# Patient Record
Sex: FEMALE | Race: ASIAN | NOT HISPANIC OR LATINO | Employment: UNEMPLOYED | URBAN - NONMETROPOLITAN AREA
[De-identification: names, ages, dates, MRNs, and addresses within clinical notes are randomized per-mention and may not be internally consistent; named-entity substitution may affect disease eponyms.]

---

## 2023-03-27 PROBLEM — K25.9 MULTIPLE GASTRIC ULCERS: Status: ACTIVE | Noted: 2023-03-27

## 2024-02-16 ENCOUNTER — TELEPHONE (OUTPATIENT)
Dept: PRIMARY CARE | Facility: CLINIC | Age: 69
End: 2024-02-16
Payer: COMMERCIAL

## 2024-02-16 NOTE — TELEPHONE ENCOUNTER
Any postmenopausal bleeding absolutely needs evaluated by gynecology     We could also have her start with some basicevaluation in our office, pelvic exam, ultrasound, etc, but still will need referral to gynecology   Please offer acute office visit, any doc       Dr. Janice Burris, Dr. Radha Rubio  [facility]   [address], [address]  [place] [address] [phone]    [facility] [address], [place] [address]  [phone]

## 2024-02-16 NOTE — TELEPHONE ENCOUNTER
Patient called in she has been in menopause since she has been 56 and recently in this past month she has been spotting when she wipes. She has not had to use any pads or tampons. She is wondering if she come in and see you or a gynecologist. If you would like her to see a gynecologist then she would like to know if you have any recommendations for her? Please call back. She also has an appointment for physical set up with you in March.

## 2024-02-19 PROBLEM — G70.00 MYASTHENIA GRAVIS (MULTI): Status: ACTIVE | Noted: 2024-02-19

## 2024-02-19 PROBLEM — F32.2 DEPRESSION, MAJOR, SINGLE EPISODE, SEVERE (MULTI): Status: ACTIVE | Noted: 2024-02-19

## 2024-02-19 PROBLEM — M25.561 CHRONIC PAIN OF BOTH KNEES: Status: ACTIVE | Noted: 2024-02-19

## 2024-02-19 PROBLEM — R91.1 LUNG NODULE: Status: ACTIVE | Noted: 2024-02-19

## 2024-02-19 PROBLEM — K76.0 FATTY LIVER: Status: ACTIVE | Noted: 2024-02-19

## 2024-02-19 PROBLEM — E55.9 VITAMIN D DEFICIENCY: Status: ACTIVE | Noted: 2024-02-19

## 2024-02-19 PROBLEM — G89.29 CHRONIC PAIN OF BOTH KNEES: Status: ACTIVE | Noted: 2024-02-19

## 2024-02-19 PROBLEM — E66.9 OBESITY: Status: ACTIVE | Noted: 2024-02-19

## 2024-02-19 PROBLEM — M17.9 KNEE OSTEOARTHRITIS: Status: ACTIVE | Noted: 2024-02-19

## 2024-02-19 PROBLEM — H02.409 PTOSIS: Status: ACTIVE | Noted: 2024-02-19

## 2024-02-19 PROBLEM — R03.0 ELEVATED BLOOD PRESSURE READING: Status: ACTIVE | Noted: 2024-02-19

## 2024-02-19 PROBLEM — K22.89 ESOPHAGEAL THICKENING: Status: ACTIVE | Noted: 2024-02-19

## 2024-02-19 PROBLEM — E78.5 HYPERLIPIDEMIA: Status: ACTIVE | Noted: 2024-02-19

## 2024-02-19 PROBLEM — F41.9 ANXIETY: Status: ACTIVE | Noted: 2024-02-19

## 2024-02-19 PROBLEM — J30.9 ALLERGIC RHINITIS: Status: ACTIVE | Noted: 2024-02-19

## 2024-02-19 PROBLEM — M81.0 OSTEOPOROSIS: Status: ACTIVE | Noted: 2024-02-19

## 2024-02-19 PROBLEM — S83.249A MEDIAL MENISCUS TEAR: Status: ACTIVE | Noted: 2024-02-19

## 2024-02-19 PROBLEM — F17.210 CIGARETTE NICOTINE DEPENDENCE WITHOUT COMPLICATION: Status: ACTIVE | Noted: 2024-02-19

## 2024-02-19 PROBLEM — M25.562 CHRONIC PAIN OF BOTH KNEES: Status: ACTIVE | Noted: 2024-02-19

## 2024-02-19 PROBLEM — I10 BENIGN ESSENTIAL HYPERTENSION: Status: ACTIVE | Noted: 2024-02-19

## 2024-02-19 PROBLEM — E27.8 ADRENAL NODULE (MULTI): Status: ACTIVE | Noted: 2024-02-19

## 2024-02-19 PROBLEM — E27.9 ADRENAL NODULE: Status: ACTIVE | Noted: 2024-02-19

## 2024-02-19 NOTE — PROGRESS NOTES
Subjective   Patient ID: Kiana Casillas is a 68 y.o. female who presents for Vaginal Bleeding (First noticed this 1 month ago some slight pink /).    HPI     Here today for evaluation of postmenopausal vaginal bleeding--   Menopause age 56  For past month, she is noticing spotting when wiping   Last pap- many many years ago   Started 1 month ago  Happening sporadically (not daily)   Just noticing it when wiping   She did start wearing a pad and noting slight pink bleeding there as well   No dysuria, freq, urgency, hematuria   No abd pain  No blood in stools   Smoking     Current Outpatient Medications   Medication Sig Dispense Refill    aspirin 81 mg EC tablet Take 2 tablets (162 mg) by mouth once daily.      b complex 0.4 mg tablet Take 1 tablet by mouth once daily.      cetirizine (ZyrTEC) 10 mg chewable tablet Chew.      cholecalciferol (Vitamin D-3) 125 MCG (5000 UT) capsule Take 1 capsule (125 mcg) by mouth twice a day.      coenzyme Q-10 100 mg capsule Take 1 capsule (100 mg) by mouth twice a day.      ginkgo biloba xt-bacopa lf ext (Ginkgo Biloba Plus, Bacopa,) 120-40 mg capsule Take by mouth.      krill oil 500 mg capsule Take by mouth.      multivitamin tablet Take 1 tablet by mouth once daily.      s-adenosylmethionine (FESTUS-e, Enteric Coated,) 400 mg tablet,delayed release (DR/EC) Take by mouth.      omega-3 fatty acids-fish oil (One-Per-Day Omega-3) 684-1,200 mg capsule Take by mouth.       No current facility-administered medications for this visit.       Review of Systems   Constitutional:  Negative for chills, fatigue and fever.   Respiratory:  Negative for cough and shortness of breath.    Cardiovascular:  Negative for chest pain and palpitations.   Gastrointestinal:  Negative for abdominal pain and blood in stool.   Genitourinary:  Positive for vaginal bleeding. Negative for dysuria, frequency, genital sores, hematuria, pelvic pain, urgency and vaginal discharge.             Objective   /79  "(BP Location: Left arm, Patient Position: Sitting, BP Cuff Size: Adult)   Pulse 75   Temp 36.8 °C (98.2 °F) (Temporal)   Resp 16   Ht 1.575 m (5' 2\")   Wt 74.2 kg (163 lb 9.6 oz)   LMP  (Exact Date)   SpO2 98%   BMI 29.92 kg/m²     Physical Exam    Constitutional: Well developed, well nourished, alert and in no acute distress.  Genitourinary: External genitalia and vagina normal. Urethra normal. No suprapubic tenderness to palpation. No vaginal discharge or bleeding. Cervix normal.     Assessment/Plan   Problem List Items Addressed This Visit             ICD-10-CM    Cigarette nicotine dependence without complication F17.210    Hyperlipidemia E78.5    Relevant Orders    TSH with reflex to Free T4 if abnormal    Comprehensive Metabolic Panel    Lipid Panel    Vitamin D deficiency E55.9    Relevant Orders    Vitamin D 25-Hydroxy,Total (for eval of Vitamin D levels)    Postmenopausal vaginal bleeding - Primary N95.0     Work-up initiated --   Pap done today   Schedule with GYN for EMBx and Pelvis US ordered            Relevant Orders    THINPREP PAP TEST    CBC and Auto Differential    Referral to Gynecology    US pelvis     Other Visit Diagnoses         Codes    Screening for cervical cancer     Z12.4    Relevant Orders    THINPREP PAP TEST    Need for hepatitis C screening test     Z11.59    Relevant Orders    Hepatitis C Antibody          Wellness already scheduled next month   Lynn Bills DO  2/20/2024  "

## 2024-02-20 ENCOUNTER — OFFICE VISIT (OUTPATIENT)
Dept: PRIMARY CARE | Facility: CLINIC | Age: 69
End: 2024-02-20
Payer: COMMERCIAL

## 2024-02-20 ENCOUNTER — LAB (OUTPATIENT)
Dept: LAB | Facility: LAB | Age: 69
End: 2024-02-20
Payer: COMMERCIAL

## 2024-02-20 VITALS
SYSTOLIC BLOOD PRESSURE: 121 MMHG | TEMPERATURE: 98.2 F | HEART RATE: 75 BPM | BODY MASS INDEX: 30.11 KG/M2 | HEIGHT: 62 IN | OXYGEN SATURATION: 98 % | RESPIRATION RATE: 16 BRPM | WEIGHT: 163.6 LBS | DIASTOLIC BLOOD PRESSURE: 79 MMHG

## 2024-02-20 DIAGNOSIS — Z12.4 SCREENING FOR CERVICAL CANCER: ICD-10-CM

## 2024-02-20 DIAGNOSIS — E78.5 HYPERLIPIDEMIA, UNSPECIFIED HYPERLIPIDEMIA TYPE: ICD-10-CM

## 2024-02-20 DIAGNOSIS — N95.0 POSTMENOPAUSAL VAGINAL BLEEDING: Primary | ICD-10-CM

## 2024-02-20 DIAGNOSIS — E55.9 VITAMIN D DEFICIENCY: ICD-10-CM

## 2024-02-20 DIAGNOSIS — Z11.59 NEED FOR HEPATITIS C SCREENING TEST: ICD-10-CM

## 2024-02-20 DIAGNOSIS — F17.210 CIGARETTE NICOTINE DEPENDENCE WITHOUT COMPLICATION: ICD-10-CM

## 2024-02-20 DIAGNOSIS — N95.0 POSTMENOPAUSAL VAGINAL BLEEDING: ICD-10-CM

## 2024-02-20 PROCEDURE — 87800 DETECT AGNT MULT DNA DIREC: CPT

## 2024-02-20 PROCEDURE — 99214 OFFICE O/P EST MOD 30 MIN: CPT | Performed by: FAMILY MEDICINE

## 2024-02-20 PROCEDURE — 36415 COLL VENOUS BLD VENIPUNCTURE: CPT

## 2024-02-20 PROCEDURE — 86803 HEPATITIS C AB TEST: CPT

## 2024-02-20 PROCEDURE — 3074F SYST BP LT 130 MM HG: CPT | Performed by: FAMILY MEDICINE

## 2024-02-20 PROCEDURE — 3078F DIAST BP <80 MM HG: CPT | Performed by: FAMILY MEDICINE

## 2024-02-20 PROCEDURE — 87624 HPV HI-RISK TYP POOLED RSLT: CPT

## 2024-02-20 PROCEDURE — 1159F MED LIST DOCD IN RCRD: CPT | Performed by: FAMILY MEDICINE

## 2024-02-20 PROCEDURE — 82306 VITAMIN D 25 HYDROXY: CPT

## 2024-02-20 PROCEDURE — 84443 ASSAY THYROID STIM HORMONE: CPT

## 2024-02-20 PROCEDURE — 80053 COMPREHEN METABOLIC PANEL: CPT

## 2024-02-20 PROCEDURE — 80061 LIPID PANEL: CPT

## 2024-02-20 PROCEDURE — 87661 TRICHOMONAS VAGINALIS AMPLIF: CPT

## 2024-02-20 PROCEDURE — 1160F RVW MEDS BY RX/DR IN RCRD: CPT | Performed by: FAMILY MEDICINE

## 2024-02-20 PROCEDURE — 85025 COMPLETE CBC W/AUTO DIFF WBC: CPT

## 2024-02-20 PROCEDURE — 88142 CYTOPATH C/V THIN LAYER: CPT

## 2024-02-20 RX ORDER — AMLODIPINE BESYLATE 5 MG/1
1 TABLET ORAL DAILY
COMMUNITY
Start: 2021-05-03 | End: 2024-02-20 | Stop reason: ALTCHOICE

## 2024-02-20 RX ORDER — LANOLIN ALCOHOL/MO/W.PET/CERES
400 CREAM (GRAM) TOPICAL
COMMUNITY
Start: 2014-05-22 | End: 2024-02-20 | Stop reason: ALTCHOICE

## 2024-02-20 RX ORDER — CHOLECALCIFEROL (VITAMIN D3) 125 MCG
CAPSULE ORAL
COMMUNITY
End: 2024-03-18 | Stop reason: ALTCHOICE

## 2024-02-20 RX ORDER — MULTIVITAMIN
1 TABLET ORAL
COMMUNITY
Start: 2014-05-22

## 2024-02-20 RX ORDER — CALCIUM CARBONATE/VITAMIN D3 600 MG-10
TABLET ORAL
COMMUNITY

## 2024-02-20 RX ORDER — EPINEPHRINE 0.22MG
100 AEROSOL WITH ADAPTER (ML) INHALATION 2 TIMES DAILY
COMMUNITY
Start: 2014-05-22

## 2024-02-20 RX ORDER — MULTIVITAMIN/IRON/FOLIC ACID 18MG-0.4MG
1 TABLET ORAL DAILY
COMMUNITY

## 2024-02-20 RX ORDER — S-ADENOSYLMETHIONINE SUL TOSYL 400 MG
TABLET, DELAYED RELEASE (ENTERIC COATED) ORAL
COMMUNITY

## 2024-02-20 RX ORDER — ASPIRIN 81 MG/1
2 TABLET ORAL DAILY
COMMUNITY
Start: 2014-03-05

## 2024-02-20 RX ORDER — TOPIRAMATE SPINKLE 15 MG/1
15 CAPSULE ORAL 2 TIMES DAILY
COMMUNITY
Start: 2014-05-22 | End: 2024-02-20 | Stop reason: ALTCHOICE

## 2024-02-20 RX ORDER — MV/FA/DHA/EPA/FISH OIL/SAW/GNK 400MCG-200
COMBINATION PACKAGE (EA) ORAL
COMMUNITY

## 2024-02-20 RX ORDER — CHOLECALCIFEROL (VITAMIN D3) 125 MCG
5000 CAPSULE ORAL 2 TIMES DAILY
COMMUNITY
Start: 2014-05-22

## 2024-02-20 RX ORDER — CETIRIZINE HYDROCHLORIDE 10 MG/1
TABLET, CHEWABLE ORAL
COMMUNITY
Start: 2007-08-29

## 2024-02-20 ASSESSMENT — ENCOUNTER SYMPTOMS
ABDOMINAL PAIN: 0
DYSURIA: 0
HEMATURIA: 0
COUGH: 0
FATIGUE: 0
BLOOD IN STOOL: 0
SHORTNESS OF BREATH: 0
FEVER: 0
CHILLS: 0
FREQUENCY: 0
PALPITATIONS: 0

## 2024-02-21 ENCOUNTER — HOSPITAL ENCOUNTER (OUTPATIENT)
Dept: RADIOLOGY | Facility: CLINIC | Age: 69
Discharge: HOME | End: 2024-02-21
Payer: COMMERCIAL

## 2024-02-21 DIAGNOSIS — N95.0 POSTMENOPAUSAL BLEEDING: ICD-10-CM

## 2024-02-21 DIAGNOSIS — N95.0 POSTMENOPAUSAL VAGINAL BLEEDING: ICD-10-CM

## 2024-02-21 LAB
25(OH)D3 SERPL-MCNC: 43 NG/ML (ref 30–100)
ALBUMIN SERPL BCP-MCNC: 4.6 G/DL (ref 3.4–5)
ALP SERPL-CCNC: 75 U/L (ref 33–136)
ALT SERPL W P-5'-P-CCNC: 10 U/L (ref 7–45)
ANION GAP SERPL CALC-SCNC: 15 MMOL/L (ref 10–20)
AST SERPL W P-5'-P-CCNC: 14 U/L (ref 9–39)
BASOPHILS # BLD AUTO: 0.07 X10*3/UL (ref 0–0.1)
BASOPHILS NFR BLD AUTO: 1.1 %
BILIRUB SERPL-MCNC: 0.4 MG/DL (ref 0–1.2)
BUN SERPL-MCNC: 15 MG/DL (ref 6–23)
CALCIUM SERPL-MCNC: 9.8 MG/DL (ref 8.6–10.6)
CHLORIDE SERPL-SCNC: 103 MMOL/L (ref 98–107)
CHOLEST SERPL-MCNC: 239 MG/DL (ref 0–199)
CHOLESTEROL/HDL RATIO: 3.5
CO2 SERPL-SCNC: 29 MMOL/L (ref 21–32)
CREAT SERPL-MCNC: 0.57 MG/DL (ref 0.5–1.05)
EGFRCR SERPLBLD CKD-EPI 2021: >90 ML/MIN/1.73M*2
EOSINOPHIL # BLD AUTO: 0.05 X10*3/UL (ref 0–0.7)
EOSINOPHIL NFR BLD AUTO: 0.8 %
ERYTHROCYTE [DISTWIDTH] IN BLOOD BY AUTOMATED COUNT: 13.2 % (ref 11.5–14.5)
GLUCOSE SERPL-MCNC: 99 MG/DL (ref 74–99)
HCT VFR BLD AUTO: 46.8 % (ref 36–46)
HCV AB SER QL: NONREACTIVE
HDLC SERPL-MCNC: 68 MG/DL
HGB BLD-MCNC: 15.7 G/DL (ref 12–16)
IMM GRANULOCYTES # BLD AUTO: 0.02 X10*3/UL (ref 0–0.7)
IMM GRANULOCYTES NFR BLD AUTO: 0.3 % (ref 0–0.9)
LDLC SERPL CALC-MCNC: 153 MG/DL
LYMPHOCYTES # BLD AUTO: 2.24 X10*3/UL (ref 1.2–4.8)
LYMPHOCYTES NFR BLD AUTO: 35.3 %
MCH RBC QN AUTO: 32.2 PG (ref 26–34)
MCHC RBC AUTO-ENTMCNC: 33.5 G/DL (ref 32–36)
MCV RBC AUTO: 96 FL (ref 80–100)
MONOCYTES # BLD AUTO: 0.56 X10*3/UL (ref 0.1–1)
MONOCYTES NFR BLD AUTO: 8.8 %
NEUTROPHILS # BLD AUTO: 3.41 X10*3/UL (ref 1.2–7.7)
NEUTROPHILS NFR BLD AUTO: 53.7 %
NON HDL CHOLESTEROL: 171 MG/DL (ref 0–149)
NRBC BLD-RTO: 0 /100 WBCS (ref 0–0)
PLATELET # BLD AUTO: 307 X10*3/UL (ref 150–450)
POTASSIUM SERPL-SCNC: 4.6 MMOL/L (ref 3.5–5.3)
PROT SERPL-MCNC: 7.5 G/DL (ref 6.4–8.2)
RBC # BLD AUTO: 4.87 X10*6/UL (ref 4–5.2)
SODIUM SERPL-SCNC: 142 MMOL/L (ref 136–145)
TRIGL SERPL-MCNC: 90 MG/DL (ref 0–149)
TSH SERPL-ACNC: 0.75 MIU/L (ref 0.44–3.98)
VLDL: 18 MG/DL (ref 0–40)
WBC # BLD AUTO: 6.4 X10*3/UL (ref 4.4–11.3)

## 2024-02-21 PROCEDURE — 76856 US EXAM PELVIC COMPLETE: CPT

## 2024-02-21 PROCEDURE — 76830 TRANSVAGINAL US NON-OB: CPT | Performed by: RADIOLOGY

## 2024-02-21 PROCEDURE — 76830 TRANSVAGINAL US NON-OB: CPT

## 2024-02-21 PROCEDURE — 76857 US EXAM PELVIC LIMITED: CPT | Performed by: RADIOLOGY

## 2024-02-22 LAB
C TRACH RRNA SPEC QL NAA+PROBE: NEGATIVE
N GONORRHOEA DNA SPEC QL PROBE+SIG AMP: NEGATIVE
T VAGINALIS RRNA SPEC QL NAA+PROBE: NEGATIVE

## 2024-02-23 DIAGNOSIS — K22.89 ESOPHAGEAL THICKENING: Primary | ICD-10-CM

## 2024-02-23 DIAGNOSIS — N95.0 POSTMENOPAUSAL VAGINAL BLEEDING: ICD-10-CM

## 2024-02-23 DIAGNOSIS — R93.89 ENDOMETRIAL THICKENING ON ULTRASOUND: ICD-10-CM

## 2024-02-28 ENCOUNTER — APPOINTMENT (OUTPATIENT)
Dept: PRIMARY CARE | Facility: CLINIC | Age: 69
End: 2024-02-28
Payer: COMMERCIAL

## 2024-03-05 LAB
CYTOLOGY CMNT CVX/VAG CYTO-IMP: NORMAL
HPV HR 12 DNA GENITAL QL NAA+PROBE: NEGATIVE
HPV HR GENOTYPES PNL CVX NAA+PROBE: NEGATIVE
HPV16 DNA SPEC QL NAA+PROBE: NEGATIVE
HPV18 DNA SPEC QL NAA+PROBE: NEGATIVE
LAB AP HPV GENOTYPE QUESTION: YES
LAB AP HPV HR: NORMAL
LAB AP PAP ADDITIONAL TESTS: NORMAL
LABORATORY COMMENT REPORT: NORMAL
LABORATORY COMMENT REPORT: NORMAL
MENSTRUAL HX REPORTED: NORMAL
PATH REPORT.TOTAL CANCER: NORMAL

## 2024-03-18 ENCOUNTER — OFFICE VISIT (OUTPATIENT)
Dept: PRIMARY CARE | Facility: CLINIC | Age: 69
End: 2024-03-18
Payer: COMMERCIAL

## 2024-03-18 VITALS
OXYGEN SATURATION: 98 % | HEIGHT: 62 IN | HEART RATE: 84 BPM | BODY MASS INDEX: 30.24 KG/M2 | SYSTOLIC BLOOD PRESSURE: 138 MMHG | RESPIRATION RATE: 16 BRPM | WEIGHT: 164.3 LBS | TEMPERATURE: 97.8 F | DIASTOLIC BLOOD PRESSURE: 83 MMHG

## 2024-03-18 DIAGNOSIS — E78.5 HYPERLIPIDEMIA, UNSPECIFIED HYPERLIPIDEMIA TYPE: ICD-10-CM

## 2024-03-18 DIAGNOSIS — N95.0 POSTMENOPAUSAL VAGINAL BLEEDING: ICD-10-CM

## 2024-03-18 DIAGNOSIS — R93.89 ENDOMETRIAL THICKENING ON ULTRASOUND: ICD-10-CM

## 2024-03-18 DIAGNOSIS — E66.9 CLASS 1 OBESITY WITHOUT SERIOUS COMORBIDITY WITH BODY MASS INDEX (BMI) OF 30.0 TO 30.9 IN ADULT, UNSPECIFIED OBESITY TYPE: ICD-10-CM

## 2024-03-18 DIAGNOSIS — G70.00 MYASTHENIA GRAVIS (MULTI): ICD-10-CM

## 2024-03-18 DIAGNOSIS — E27.8 ADRENAL NODULE (MULTI): ICD-10-CM

## 2024-03-18 DIAGNOSIS — M81.0 AGE-RELATED OSTEOPOROSIS WITHOUT CURRENT PATHOLOGICAL FRACTURE: ICD-10-CM

## 2024-03-18 DIAGNOSIS — M25.561 CHRONIC PAIN OF BOTH KNEES: ICD-10-CM

## 2024-03-18 DIAGNOSIS — M25.562 CHRONIC PAIN OF BOTH KNEES: ICD-10-CM

## 2024-03-18 DIAGNOSIS — M79.605 LEFT LEG PAIN: ICD-10-CM

## 2024-03-18 DIAGNOSIS — Z53.20 MAMMOGRAM DECLINED: ICD-10-CM

## 2024-03-18 DIAGNOSIS — Z12.11 SCREEN FOR COLON CANCER: ICD-10-CM

## 2024-03-18 DIAGNOSIS — E55.9 VITAMIN D DEFICIENCY: ICD-10-CM

## 2024-03-18 DIAGNOSIS — R91.1 LUNG NODULE: ICD-10-CM

## 2024-03-18 DIAGNOSIS — Z00.00 ROUTINE GENERAL MEDICAL EXAMINATION AT HEALTH CARE FACILITY: Primary | ICD-10-CM

## 2024-03-18 DIAGNOSIS — F17.210 CIGARETTE NICOTINE DEPENDENCE WITHOUT COMPLICATION: ICD-10-CM

## 2024-03-18 DIAGNOSIS — M17.9 OSTEOARTHRITIS OF KNEE, UNSPECIFIED LATERALITY, UNSPECIFIED OSTEOARTHRITIS TYPE: ICD-10-CM

## 2024-03-18 DIAGNOSIS — G89.29 CHRONIC PAIN OF BOTH KNEES: ICD-10-CM

## 2024-03-18 DIAGNOSIS — Z00.00 MEDICARE ANNUAL WELLNESS VISIT, SUBSEQUENT: ICD-10-CM

## 2024-03-18 DIAGNOSIS — Z13.39 ALCOHOL SCREENING: ICD-10-CM

## 2024-03-18 PROBLEM — E66.811 CLASS 1 OBESITY WITHOUT SERIOUS COMORBIDITY WITH BODY MASS INDEX (BMI) OF 30.0 TO 30.9 IN ADULT: Status: ACTIVE | Noted: 2024-02-19

## 2024-03-18 PROBLEM — I10 BENIGN ESSENTIAL HYPERTENSION: Status: RESOLVED | Noted: 2024-02-19 | Resolved: 2024-03-18

## 2024-03-18 PROBLEM — F32.2 DEPRESSION, MAJOR, SINGLE EPISODE, SEVERE (MULTI): Status: RESOLVED | Noted: 2024-02-19 | Resolved: 2024-03-18

## 2024-03-18 PROCEDURE — G0442 ANNUAL ALCOHOL SCREEN 15 MIN: HCPCS | Performed by: FAMILY MEDICINE

## 2024-03-18 PROCEDURE — 1170F FXNL STATUS ASSESSED: CPT | Performed by: FAMILY MEDICINE

## 2024-03-18 PROCEDURE — 1123F ACP DISCUSS/DSCN MKR DOCD: CPT | Performed by: FAMILY MEDICINE

## 2024-03-18 PROCEDURE — 1160F RVW MEDS BY RX/DR IN RCRD: CPT | Performed by: FAMILY MEDICINE

## 2024-03-18 PROCEDURE — 1158F ADVNC CARE PLAN TLK DOCD: CPT | Performed by: FAMILY MEDICINE

## 2024-03-18 PROCEDURE — 3008F BODY MASS INDEX DOCD: CPT | Performed by: FAMILY MEDICINE

## 2024-03-18 PROCEDURE — 99214 OFFICE O/P EST MOD 30 MIN: CPT | Performed by: FAMILY MEDICINE

## 2024-03-18 PROCEDURE — G0439 PPPS, SUBSEQ VISIT: HCPCS | Performed by: FAMILY MEDICINE

## 2024-03-18 PROCEDURE — 1159F MED LIST DOCD IN RCRD: CPT | Performed by: FAMILY MEDICINE

## 2024-03-18 ASSESSMENT — LIFESTYLE VARIABLES
HOW OFTEN DURING THE LAST YEAR HAVE YOU FAILED TO DO WHAT WAS NORMALLY EXPECTED FROM YOU BECAUSE OF DRINKING: NEVER
HOW OFTEN DURING THE LAST YEAR HAVE YOU BEEN UNABLE TO REMEMBER WHAT HAPPENED THE NIGHT BEFORE BECAUSE YOU HAD BEEN DRINKING: NEVER
AUDIT-C TOTAL SCORE: 0
HAVE YOU OR SOMEONE ELSE BEEN INJURED AS A RESULT OF YOUR DRINKING: NO
HOW OFTEN DO YOU HAVE A DRINK CONTAINING ALCOHOL: NEVER
HOW OFTEN DO YOU HAVE SIX OR MORE DRINKS ON ONE OCCASION: NEVER
HOW MANY STANDARD DRINKS CONTAINING ALCOHOL DO YOU HAVE ON A TYPICAL DAY: PATIENT DOES NOT DRINK
AUDIT TOTAL SCORE: 0
HAS A RELATIVE, FRIEND, DOCTOR, OR ANOTHER HEALTH PROFESSIONAL EXPRESSED CONCERN ABOUT YOUR DRINKING OR SUGGESTED YOU CUT DOWN: NO
SKIP TO QUESTIONS 9-10: 1
HOW OFTEN DURING THE LAST YEAR HAVE YOU HAD A FEELING OF GUILT OR REMORSE AFTER DRINKING: NEVER
HOW OFTEN DURING THE LAST YEAR HAVE YOU FOUND THAT YOU WERE NOT ABLE TO STOP DRINKING ONCE YOU HAD STARTED: NEVER
HOW OFTEN DURING THE LAST YEAR HAVE YOU NEEDED AN ALCOHOLIC DRINK FIRST THING IN THE MORNING TO GET YOURSELF GOING AFTER A NIGHT OF HEAVY DRINKING: NEVER

## 2024-03-18 ASSESSMENT — ENCOUNTER SYMPTOMS
COUGH: 0
CHILLS: 0
BACK PAIN: 0
ARTHRALGIAS: 1
HEADACHES: 0
FATIGUE: 0
PSYCHIATRIC NEGATIVE: 1
ABDOMINAL PAIN: 0
COLOR CHANGE: 0
WEAKNESS: 0
DIZZINESS: 0
SHORTNESS OF BREATH: 0
NAUSEA: 0
JOINT SWELLING: 0
SORE THROAT: 0
VOMITING: 0
DIARRHEA: 0
PALPITATIONS: 0
CONSTIPATION: 0
FEVER: 0
NUMBNESS: 0

## 2024-03-18 ASSESSMENT — PATIENT HEALTH QUESTIONNAIRE - PHQ9
9. THOUGHTS THAT YOU WOULD BE BETTER OFF DEAD, OR OF HURTING YOURSELF: NOT AT ALL
7. TROUBLE CONCENTRATING ON THINGS, SUCH AS READING THE NEWSPAPER OR WATCHING TELEVISION: NOT AT ALL
SUM OF ALL RESPONSES TO PHQ QUESTIONS 1-9: 5
1. LITTLE INTEREST OR PLEASURE IN DOING THINGS: NOT AT ALL
8. MOVING OR SPEAKING SO SLOWLY THAT OTHER PEOPLE COULD HAVE NOTICED. OR THE OPPOSITE, BEING SO FIGETY OR RESTLESS THAT YOU HAVE BEEN MOVING AROUND A LOT MORE THAN USUAL: NOT AT ALL
10. IF YOU CHECKED OFF ANY PROBLEMS, HOW DIFFICULT HAVE THESE PROBLEMS MADE IT FOR YOU TO DO YOUR WORK, TAKE CARE OF THINGS AT HOME, OR GET ALONG WITH OTHER PEOPLE: NOT DIFFICULT AT ALL
2. FEELING DOWN, DEPRESSED OR HOPELESS: NOT AT ALL
3. TROUBLE FALLING OR STAYING ASLEEP OR SLEEPING TOO MUCH: SEVERAL DAYS
5. POOR APPETITE OR OVEREATING: MORE THAN HALF THE DAYS
SUM OF ALL RESPONSES TO PHQ9 QUESTIONS 1 AND 2: 0
6. FEELING BAD ABOUT YOURSELF - OR THAT YOU ARE A FAILURE OR HAVE LET YOURSELF OR YOUR FAMILY DOWN: SEVERAL DAYS
4. FEELING TIRED OR HAVING LITTLE ENERGY: SEVERAL DAYS

## 2024-03-18 ASSESSMENT — ACTIVITIES OF DAILY LIVING (ADL)
TAKING_MEDICATION: INDEPENDENT
GROCERY_SHOPPING: INDEPENDENT
DOING_HOUSEWORK: INDEPENDENT
DRESSING: INDEPENDENT
BATHING: INDEPENDENT
MANAGING_FINANCES: INDEPENDENT

## 2024-03-18 NOTE — ASSESSMENT & PLAN NOTE
Medication declined  10 year ASCVD risk 12.7%     Hyperlipidemia-   You have high cholesterol. (hyperlipidemia). This increases your risk for cardiovascular disease.(Heart attack/stroke).  It is recommended that the patient limit refined carbohydrates such as breads, cereals, alcohol, pasta, pretzels, and sugar- sweetened foods and beverages.  Eat lots of vegetables, lean proteins such as eggs, fish, chicken, and fruit.   Eat small amounts of healthy fat each day -  a handful of walnuts, an avocado, olive oil on your salad, and fatty fishes such as salmon. Exercise should be included as you are able, with a goal of 30 minutes 4-6 days per week    Continue any prescribed and advised OTC medications, in addition, as reminder:  *For high LDL (>130) use blueberries 1 cup daily  *Psyllium capsules or powder 2 times daily, such as Flux or Metamucil.   *Consider also adding plant stanols and sterols such as Nature Made CholestOff, available over the counter.     *For high triglycerides- start fish oil 2,000 - 4,000 mg daily.

## 2024-03-18 NOTE — ASSESSMENT & PLAN NOTE
CT ordered     Counseling for Lung Cancer Screening with LDCT:  Current smoker:   I discussed smoking history/status that determined patient meets criteria for lung cancer screening with low dose CT scan.  By using shared decision making we determined the patient will benefit from the screening, including a discussion of benefits and harms of screening, follow-up diagnostic testing, over-diagnosis, false positive rate, and total radiation exposure. I counseled the patient on the importance of adhering to annual lung cancer low dose CT screening, the impact of comorbidities, and his or her ability or willingness to undergo diagnosis and treatment if abnormalities discovered. I spent 3-10minutes counseling the patient on the importance of abstaining from cigarette smoking and, if appropriate, provide information about tobacco-cessation interventions.  I provided patient an order for Low dose CT lung cancer screening.

## 2024-03-18 NOTE — ASSESSMENT & PLAN NOTE
Medication declined  Bone health support: Make sure you are getting at least 1,200 mg daily of calcium (preferred via dietary intake, okay for supplements if needed), and 2,000 IU of vitamin D3 daily.   Encourage weight bearing exercise as able, along with balance and strength training  Reduce fall risk in the home

## 2024-03-18 NOTE — PROGRESS NOTES
Subjective   Reason for Visit: Kiana Casillas is an 68 y.o. female here for a Medicare Wellness visit.     Past Medical, Surgical, and Family History reviewed and updated in chart.    Reviewed all medications by prescribing practitioner or clinical pharmacist (such as prescriptions, OTCs, herbal therapies and supplements) and documented in the medical record.    HPI    HPOA- daughter Randi     She was seen last month for postmenopausal vaginal bleeding-  U/S showed 2 cm endometrium- referred to GYN  Scheduled with Dr. Burris - was scheduled next week but states they pushed her appt back to April - she is not happy about this   Pap 24 NILM HPV neg   Hasn't had any further spotting in past 2 weeks     Mammogram 5/3/21 neg - due - she declines     Osteoporosis- Dexa 22 - declines medication   Taking vit D 4000/ multivitamin     Adrenal nodule- 1.1 cm right side   Needs follow up adrenal CT     Labs done and reviewed today -   HPL - 10 year ASCVD risk 12.7%   CT chest showed mild coronary artery calcifications- she declines medications     Colonoscopy- never - willing to schedule     Lung cancer screenin2022- due - will do    Immunizations: pcv20 - declines     Myasthenia gravis- she has been on disability since    Last saw Dr. Aden in 2018- no longer on pyridostigmine      Smoking- 40+ years   1 ppd   Not ready to quit     Wants to do acupuncture  Doing home exercises  Tried PT already  Leg pain behind left knee, has known arthritis, has seen ortho and had injections   MRI done 2019 showed medial meniscus tear   Denies sciatica and back pain   No swelling, erythema, warmth     Current Outpatient Medications   Medication Sig Dispense Refill    aspirin 81 mg EC tablet Take 2 tablets (162 mg) by mouth once daily.      b complex 0.4 mg tablet Take 1 tablet by mouth once daily.      cetirizine (ZyrTEC) 10 mg chewable tablet Chew.      cholecalciferol (Vitamin D-3) 125 MCG (5000 UT) capsule Take  "1 capsule (125 mcg) by mouth twice a day.      coenzyme Q-10 100 mg capsule Take 1 capsule (100 mg) by mouth twice a day.      horse chestnut seed (HORSE CHESTNUT ORAL) Take by mouth.      krill oil 500 mg capsule Take by mouth.      multivitamin tablet Take 1 tablet by mouth once daily.      omega-3 fatty acids-fish oil (One-Per-Day Omega-3) 684-1,200 mg capsule Take by mouth.      s-adenosylmethionine (FESTUS-e, Enteric Coated,) 400 mg tablet,delayed release (DR/EC) Take by mouth.       No current facility-administered medications for this visit.         Patient Care Team:  Lynn Bills DO as PCP - General  Lynn Bills DO as PCP - [employer] PCP  Guille Khurram Gallegos MD as Referring Physician (Gastroenterology)  Erika Hamm MD as Referring Physician (Gastroenterology)     Review of Systems   Constitutional:  Negative for chills, fatigue and fever.   HENT:  Negative for congestion, ear pain and sore throat.    Eyes:  Negative for visual disturbance.   Respiratory:  Negative for cough and shortness of breath.    Cardiovascular:  Negative for chest pain, palpitations and leg swelling.   Gastrointestinal:  Negative for abdominal pain, constipation, diarrhea, nausea and vomiting.   Genitourinary:  Positive for vaginal bleeding.   Musculoskeletal:  Positive for arthralgias. Negative for back pain and joint swelling.   Skin:  Negative for color change and rash.   Neurological:  Negative for dizziness, weakness, numbness and headaches.   Psychiatric/Behavioral: Negative.         Objective   Vitals:  /83 (BP Location: Right arm, Patient Position: Sitting, BP Cuff Size: Small adult)   Pulse 84   Temp 36.6 °C (97.8 °F) (Temporal)   Resp 16   Ht 1.575 m (5' 2\")   Wt 74.5 kg (164 lb 4.8 oz)   SpO2 98%   BMI 30.05 kg/m²       Physical Exam    Constitutional: Well developed, well nourished, alert and in no acute distress.  Head and Face: Normocephalic, atraumatic.  Eyes: " Normal external exam. Pupils equally round and reactive to light with normal accommodation and extraocular movements intact.   ENT: External inspection of ears normal, tympanic membranes visualized and normal.   Neck: Supple, no lymphadenopathy or masses. Thyroid not enlarged, no palpable nodules.   Cardiovascular: Regular rate and rhythm, normal S1 and S2, no murmurs, gallops, or rubs. Radial pulses normal. No peripheral edema. No carotid bruits.   Pulmonary: No respiratory distress, lungs clear to auscultation bilaterally. No wheezes, rhonchi, rales.   Musculoskeletal: Gait normal. Muscle strength/tone normal of all 4 extremities. Normal range of motion of all extremities.   Skin: Warm, well perfused, normal skin turgor and color, no lesions or rashes noted.   Neurologic: Cranial nerves II-XII grossly intact. Sensation normal bilaterally.   Psychiatric: Mood calm and affect normal.      Assessment/Plan   Problem List Items Addressed This Visit       Chronic pain of both knees    Current Assessment & Plan     Follow up with orthopedics if needed          Relevant Orders    Referral to [facility]    Cigarette nicotine dependence without complication    Current Assessment & Plan     Cessation encouraged, patient not ready to quit          Relevant Orders    CT lung screening low dose    Hyperlipidemia    Current Assessment & Plan     Medication declined  10 year ASCVD risk 12.7%     Hyperlipidemia-   You have high cholesterol. (hyperlipidemia). This increases your risk for cardiovascular disease.(Heart attack/stroke).  It is recommended that the patient limit refined carbohydrates such as breads, cereals, alcohol, pasta, pretzels, and sugar- sweetened foods and beverages.  Eat lots of vegetables, lean proteins such as eggs, fish, chicken, and fruit.   Eat small amounts of healthy fat each day -  a handful of walnuts, an avocado, olive oil on your salad, and fatty fishes such as salmon. Exercise should be  included as you are able, with a goal of 30 minutes 4-6 days per week    Continue any prescribed and advised OTC medications, in addition, as reminder:  *For high LDL (>130) use blueberries 1 cup daily  *Psyllium capsules or powder 2 times daily, such as [employer] or Metamucil.   *Consider also adding plant stanols and sterols such as Nature Made CholestOff, available over the counter.     *For high triglycerides- start fish oil 2,000 - 4,000 mg daily.           Relevant Orders    Follow Up In [facility] - PCP - Established    Knee osteoarthritis    Lung nodule    Overview     CT due Dec 2023          Current Assessment & Plan     CT ordered     Counseling for Lung Cancer Screening with LDCT:  Current smoker:   I discussed smoking history/status that determined patient meets criteria for lung cancer screening with low dose CT scan.  By using shared decision making we determined the patient will benefit from the screening, including a discussion of benefits and harms of screening, follow-up diagnostic testing, over-diagnosis, false positive rate, and total radiation exposure. I counseled the patient on the importance of adhering to annual lung cancer low dose CT screening, the impact of comorbidities, and his or her ability or willingness to undergo diagnosis and treatment if abnormalities discovered. I spent 3-10minutes counseling the patient on the importance of abstaining from cigarette smoking and, if appropriate, provide information about tobacco-cessation interventions.  I provided patient an order for Low dose CT lung cancer screening.           Myasthenia gravis (CMS/HCC)    Class 1 obesity without serious comorbidity with body mass index (BMI) of 30.0 to 30.9 in adult    Vitamin D deficiency    Adrenal nodule (CMS/HCC)    Overview     Needs follow up CT Dec 2023          Current Assessment & Plan     Adrenal CT ordered          Relevant Orders    CT adrenals w and wo IV contrast     Osteoporosis    Overview     Dexa 12/16/22          Current Assessment & Plan     Medication declined  Bone health support: Make sure you are getting at least 1,200 mg daily of calcium (preferred via dietary intake, okay for supplements if needed), and 2,000 IU of vitamin D3 daily.   Encourage weight bearing exercise as able, along with balance and strength training  Reduce fall risk in the home           Postmenopausal vaginal bleeding    Current Assessment & Plan     Discussed need for EMBx with GYN - scheduled with Dr. Burris          Endometrial thickening on ultrasound    Current Assessment & Plan     Discussed need for EMBx with GYN - scheduled with Dr. Burris           Other Visit Diagnoses       Routine general medical examination at health care facility    -  Primary    Medicare annual wellness visit, subsequent        Alcohol screening        Mammogram declined        Screen for colon cancer        Relevant Orders    Colonoscopy Screening; Average Risk Patient    Left leg pain        Relevant Orders    Referral to [facility]          Lynn Bills DO  3/18/2024

## 2024-04-01 ENCOUNTER — HOSPITAL ENCOUNTER (OUTPATIENT)
Dept: RADIOLOGY | Facility: CLINIC | Age: 69
Discharge: HOME | End: 2024-04-01
Payer: COMMERCIAL

## 2024-04-01 DIAGNOSIS — F17.210 CIGARETTE NICOTINE DEPENDENCE WITHOUT COMPLICATION: ICD-10-CM

## 2024-04-01 DIAGNOSIS — E27.8 ADRENAL NODULE (MULTI): ICD-10-CM

## 2024-04-01 PROCEDURE — 74170 CT ABD WO CNTRST FLWD CNTRST: CPT

## 2024-04-01 PROCEDURE — 2550000001 HC RX 255 CONTRASTS: Performed by: FAMILY MEDICINE

## 2024-04-01 PROCEDURE — 71271 CT THORAX LUNG CANCER SCR C-: CPT

## 2024-04-01 RX ADMIN — IOHEXOL 70 ML: 350 INJECTION, SOLUTION INTRAVENOUS at 12:14

## 2024-05-03 ENCOUNTER — TELEPHONE (OUTPATIENT)
Dept: PRIMARY CARE | Facility: CLINIC | Age: 69
End: 2024-05-03
Payer: COMMERCIAL

## 2024-05-03 NOTE — TELEPHONE ENCOUNTER
Called patient and relayed message.  Patient was agreeable and will contact us if she needs us in the future. RADHA

## 2024-05-03 NOTE — TELEPHONE ENCOUNTER
If she is in that much pain she needs to go back to ER for evaluation     I am not sure what is causing her pain but advise she be evaluated sooner rather than later

## 2024-05-03 NOTE — TELEPHONE ENCOUNTER
Patient called very upset and said that she had a biopsy done with Dr. Rubio and since she had this biopsy done her left leg has been very swelled and painful as well as she is having very bad discharge. She called Dr. Rubio office and they gave her an antibiotic. She ended up in the ER because of the pain and the ER gave her percocet and tylenol then sent her home. She called Dr. Rubio office again and they told her to call her PCP. Patient is tearful and having a hard time moving she is asking for your advice.     I pulled in her ER records for you to view.

## 2024-05-03 NOTE — TELEPHONE ENCOUNTER
Please obtain records from Dr. Rubio    Reviewed ER records- U/S neg for DVT    Please call patient to offer her an acute visit in our office, next available, any doc for eval of symptoms

## 2024-05-03 NOTE — TELEPHONE ENCOUNTER
I called pt and she is asking what the appointment is for   She stated she is extremely frustrated and crying in pain   Stated the hospital didn't do anything for her besides give her percocet   She said she can not get out of her house she doesn't have a way here         I sent the request for the records       Please advise

## 2024-09-17 ENCOUNTER — APPOINTMENT (OUTPATIENT)
Dept: PRIMARY CARE | Facility: CLINIC | Age: 69
End: 2024-09-17
Payer: COMMERCIAL

## 2025-06-25 ENCOUNTER — OFFICE VISIT (OUTPATIENT)
Dept: GYNECOLOGIC ONCOLOGY | Facility: CLINIC | Age: 70
End: 2025-06-25
Payer: COMMERCIAL

## 2025-06-25 ENCOUNTER — LAB (OUTPATIENT)
Dept: LAB | Facility: CLINIC | Age: 70
End: 2025-06-25
Payer: COMMERCIAL

## 2025-06-25 VITALS
TEMPERATURE: 98.2 F | WEIGHT: 167.11 LBS | HEIGHT: 61 IN | SYSTOLIC BLOOD PRESSURE: 145 MMHG | DIASTOLIC BLOOD PRESSURE: 89 MMHG | BODY MASS INDEX: 31.55 KG/M2 | RESPIRATION RATE: 17 BRPM | OXYGEN SATURATION: 98 % | HEART RATE: 87 BPM

## 2025-06-25 DIAGNOSIS — M54.6 ACUTE MIDLINE THORACIC BACK PAIN: ICD-10-CM

## 2025-06-25 DIAGNOSIS — C54.1 ENDOMETRIAL CANCER (MULTI): ICD-10-CM

## 2025-06-25 DIAGNOSIS — N39.3 STRESS INCONTINENCE: ICD-10-CM

## 2025-06-25 DIAGNOSIS — C54.1 ENDOMETRIAL CANCER (MULTI): Primary | ICD-10-CM

## 2025-06-25 LAB
ANION GAP SERPL CALC-SCNC: 12 MMOL/L (ref 10–20)
BUN SERPL-MCNC: 14 MG/DL (ref 6–23)
CALCIUM SERPL-MCNC: 9.2 MG/DL (ref 8.6–10.3)
CHLORIDE SERPL-SCNC: 105 MMOL/L (ref 98–107)
CO2 SERPL-SCNC: 27 MMOL/L (ref 21–32)
CREAT SERPL-MCNC: 0.53 MG/DL (ref 0.5–1.05)
EGFRCR SERPLBLD CKD-EPI 2021: >90 ML/MIN/1.73M*2
GLUCOSE SERPL-MCNC: 84 MG/DL (ref 74–99)
POTASSIUM SERPL-SCNC: 3.7 MMOL/L (ref 3.5–5.3)
SODIUM SERPL-SCNC: 140 MMOL/L (ref 136–145)

## 2025-06-25 PROCEDURE — 80048 BASIC METABOLIC PNL TOTAL CA: CPT

## 2025-06-25 PROCEDURE — 99215 OFFICE O/P EST HI 40 MIN: CPT | Performed by: STUDENT IN AN ORGANIZED HEALTH CARE EDUCATION/TRAINING PROGRAM

## 2025-06-25 PROCEDURE — 1123F ACP DISCUSS/DSCN MKR DOCD: CPT | Performed by: STUDENT IN AN ORGANIZED HEALTH CARE EDUCATION/TRAINING PROGRAM

## 2025-06-25 PROCEDURE — 36415 COLL VENOUS BLD VENIPUNCTURE: CPT

## 2025-06-25 PROCEDURE — 1160F RVW MEDS BY RX/DR IN RCRD: CPT | Performed by: STUDENT IN AN ORGANIZED HEALTH CARE EDUCATION/TRAINING PROGRAM

## 2025-06-25 PROCEDURE — 3008F BODY MASS INDEX DOCD: CPT | Performed by: STUDENT IN AN ORGANIZED HEALTH CARE EDUCATION/TRAINING PROGRAM

## 2025-06-25 PROCEDURE — 1125F AMNT PAIN NOTED PAIN PRSNT: CPT | Performed by: STUDENT IN AN ORGANIZED HEALTH CARE EDUCATION/TRAINING PROGRAM

## 2025-06-25 PROCEDURE — 1159F MED LIST DOCD IN RCRD: CPT | Performed by: STUDENT IN AN ORGANIZED HEALTH CARE EDUCATION/TRAINING PROGRAM

## 2025-06-25 RX ORDER — ASCORBIC ACID 1000 MG
175 TABLET ORAL
COMMUNITY

## 2025-06-25 ASSESSMENT — COLUMBIA-SUICIDE SEVERITY RATING SCALE - C-SSRS
1. IN THE PAST MONTH, HAVE YOU WISHED YOU WERE DEAD OR WISHED YOU COULD GO TO SLEEP AND NOT WAKE UP?: NO
2. HAVE YOU ACTUALLY HAD ANY THOUGHTS OF KILLING YOURSELF?: NO
6. HAVE YOU EVER DONE ANYTHING, STARTED TO DO ANYTHING, OR PREPARED TO DO ANYTHING TO END YOUR LIFE?: NO

## 2025-06-25 ASSESSMENT — ENCOUNTER SYMPTOMS
LOSS OF SENSATION IN FEET: 0
DEPRESSION: 0
OCCASIONAL FEELINGS OF UNSTEADINESS: 0

## 2025-06-25 ASSESSMENT — PAIN SCALES - GENERAL: PAINLEVEL_OUTOF10: 6

## 2025-06-25 ASSESSMENT — PATIENT HEALTH QUESTIONNAIRE - PHQ9
1. LITTLE INTEREST OR PLEASURE IN DOING THINGS: NOT AT ALL
2. FEELING DOWN, DEPRESSED OR HOPELESS: NOT AT ALL
SUM OF ALL RESPONSES TO PHQ9 QUESTIONS 1 AND 2: 0

## 2025-06-25 NOTE — PROGRESS NOTES
"  Gynecologic Oncology Initial Consultation  [facility]    Patient ID: Kiana Casillas \"Gladys\", 69 y.o.  Referring Physician: No referring provider defined for this encounter.  Primary Care Provider: Lynn Bills DO      Reason for Consultation: endometrial cancer  Subjective    HPI 68 y/o F with stage IB endometrioid endometrial cancer here to establish. Completed adjuvant RT  - 24. Had been doing well. Acute onset mid back pain. Sharp. Rates 6-10/10. Radiation to the buttox. Treatment with IBU with some relief. Not reproducible on palpation. Limiting ambulation. Now walking with cane and wheeled walker. Worried it may be cancer recurrence versus toxicity. Was unable to get in with local radiation oncology and gynecologic oncology. Desires to transfer care to .     Denies fevers/chills, N/V, CP, SOB, abdominal pain, dysuria, hematuria, constipation, diarrhea. Stress incontinence reported. Denies VB.    A comprehensive review of systems was performed and otherwise negative.    Objective      Medical History[1]     Surgical History[2]     Family History[3]  Otherwise, denies history of endometrial, ovarian, breast, prostate, or colorectal cancers.    OBGYN Hx:  - ;  x2  - Menopause at 59; denies HRT use  - Last Pap unknown    Screening:  - Last Mammogram: UTD  - Last Colonoscopy: never completed, declines    Social Hx:  Kiana Casillas  reports that she has been smoking cigarettes. She has a 40 pack-year smoking history. She has been exposed to tobacco smoke. She has never used smokeless tobacco.  She  reports that she does not currently use alcohol.  She  reports no history of drug use.  Lives at home with son and daughter in law. Not currently working.     Physical Exam  BSA: 1.8 meters squared  /89   Pulse 87   Temp 36.8 °C (98.2 °F)   Resp 17   Ht (S) (!) 1.538 m (5' 0.55\")   Wt 75.8 kg (167 lb 1.7 oz)   SpO2 98%   BMI 32.04 kg/m²   General:   alert and oriented, in no " acute distress   Heart: regular rate and rhythm, S1, S2 normal, no murmur, click, rub or gallop   Lungs: clear to auscultation bilaterally   Abdomen: soft, non-tender, without masses or organomegaly   Vulva: normal   Vagina: Atrophic vaginal mucosa, smooth 5 mm nodule mid lateral vaginal wall soft on palpation suspect fold of the vagina and low concern for cancer recurrence   Cervix: absent   Uterus: absent   Adnexa: absent   Rectal: deferred and external, thrombosed hemorrhoid    Lymph Nodes:  Cervical, supraclavicular, and axillary nodes normal.   Extremities: warm, well-perfused without cyanosis, clubbing or edema   Skin: Normal   Lines/Access:      Pathology:  See above    Imaging:  None      Performance Status:  Asymptomatic    Assessment/Plan    69 y.o. with  stage IB endometrioid endometrial cancer  + LVI MMRd MLH1 pending, back pain    Oncology History Overview Note   7/2024 [facility]  Pathology revealed grade 1 endometrioid adenocarcinoma the endometrium. Depth of myometrial invasion was 10 mm out of 20 mm, 50%. There was no serosal involvement, lower uterine segment involvement, or cervical stromal involvement. It was positive for lymphovascular invasion. 1 out of 3 left pelvic sentinel nodes was involved with rare isolated tumor cells. Four right pelvic sentinel nodes were negative. Pathologically staged T1b N0(I+), IB. There was a loss of expression of PMS2 and MLH1.   8/19/24 - 9/26/24: 50.40/50.40 Gy to the Pelvis in 28 fractions of 1.80 Gy using the VMAT/Daily IGRT technique with 10 MV.      Endometrial cancer (Multi)   6/25/2025 Initial Diagnosis    Endometrial cancer (Multi)       Diagnoses and all orders for this visit:    # Endometrial cancer  - Discussed the significance of histologic subtype, grade and stage  - Pathology reviewed  - Exam without s/sx recurrence, vaginal nodule suspect benign biopsy deferred  - Follow up MLH1, records requested     # back pain   - etiology unknown, low  suspicion for insufficient fracture due to RT as outside of treatment field and cancer recurrence   - CT STAT  - Further imaging and referrals pending     # Stress incontinence  - Behavioral modifications discussed, consider urogyn referral if no relief on follow up    RTC virtual visit for results review, 3 months with ozzy García MD MPH                              [1]   Past Medical History:  Diagnosis Date    Adjustment disorder with depressed mood 05/10/2004    Depression, major, single episode, severe (Multi) 02/19/2024    Endometrial cancer (Multi)     Essential (primary) hypertension 05/03/2021    Benign essential hypertension    Headache, unspecified 09/18/2014    Chronic headaches    Myasthenia gravis     Nontoxic multinodular goiter 10/22/2017    Multinodular goiter    Other disorders of iron metabolism 06/27/2019    Iron overload    Personal history of diseases of the blood and blood-forming organs and certain disorders involving the immune mechanism 06/14/2017    History of polycythemia    Personal history of other endocrine, nutritional and metabolic disease 07/10/2019    History of thyroid nodule   [2]   Past Surgical History:  Procedure Laterality Date    APPENDECTOMY  05/30/2014    Appendectomy    CHOLECYSTECTOMY  05/30/2014    Trocar Cholecystotomy    OTHER SURGICAL HISTORY  04/14/2021    Salpingo-oophorectomy    ROBOTIC ASSISTED HYSTERECTOMY     [3]   Family History  Problem Relation Name Age of Onset    Dementia Mother

## 2025-06-26 ENCOUNTER — HOSPITAL ENCOUNTER (OUTPATIENT)
Dept: RADIOLOGY | Facility: CLINIC | Age: 70
Discharge: HOME | End: 2025-06-26
Payer: COMMERCIAL

## 2025-06-26 DIAGNOSIS — C54.1 ENDOMETRIAL CANCER (MULTI): ICD-10-CM

## 2025-06-26 PROCEDURE — 71260 CT THORAX DX C+: CPT

## 2025-06-26 PROCEDURE — 2550000001 HC RX 255 CONTRASTS: Performed by: STUDENT IN AN ORGANIZED HEALTH CARE EDUCATION/TRAINING PROGRAM

## 2025-06-26 RX ADMIN — IOHEXOL 75 ML: 350 INJECTION, SOLUTION INTRAVENOUS at 14:48

## 2025-06-27 ENCOUNTER — SOCIAL WORK (OUTPATIENT)
Dept: HEMATOLOGY/ONCOLOGY | Facility: CLINIC | Age: 70
End: 2025-06-27
Payer: COMMERCIAL

## 2025-06-27 NOTE — PROGRESS NOTES
Patient was recently seen as a new patient with Dr. García- for consultation of Endometrial Cancer. Patient identified a distress score of 4 at the visit with contributing factors as : Physical (pain, fatigue); Emotional (worry or anxiety); practical concerns (finances, insurance)-  LSW reached out to patient via phone today to follow up.  Voice message was left for the patient along with my  contact information. The patient was provided with the below letter along with information on how a  can help, patient was also provided with my contact information. No current social work needs have been identified at the moment but social work remains available should any psychosocial needs arise.      June 27, 2025    Dear Kiana,      I hope this letter finds you well. As part of [facility]'s commitment to providing comprehensive care, I wanted to introduce myself as the social work on your care team. Our team goal is to support you and your loved ones throughout your care here, addressing not only your medical needs but also your emotional, practical, and social concerns.    My name is Armando Rosas and I am a licensed social worker working within Dr. García's office.  I am available to assist you in a variety of ways, including:  Emotional Support: Offering a safe space to discuss your feelings and coping strategies.  Resource Navigation: Connecting you with financial assistance programs, local transportation resources, and other community resources.  Family Support: Helping your loved ones understand and navigate the challenges of your diagnosis.  Advanced Care Planning: Assisting with decisions about care preferences and advance directives.    These services are provided at no additional cost to you as part of your care at [facility]. I encourage you to reach out and take advantage of this support whenever needed.    If you have questions or  if you would like to set up a time to talk, please feel free to reach out to me at [phone] or email me at [email or web address]    Sincerely,      SONYA Vázquez, hospitals  Oncology [facility]  [phone]

## 2025-07-03 ENCOUNTER — APPOINTMENT (OUTPATIENT)
Dept: GYNECOLOGIC ONCOLOGY | Facility: HOSPITAL | Age: 70
End: 2025-07-03
Payer: COMMERCIAL

## 2025-08-05 DIAGNOSIS — Z12.31 ENCOUNTER FOR SCREENING MAMMOGRAM FOR BREAST CANCER: ICD-10-CM

## 2025-09-12 ENCOUNTER — APPOINTMENT (OUTPATIENT)
Dept: GYNECOLOGIC ONCOLOGY | Facility: CLINIC | Age: 70
End: 2025-09-12
Payer: COMMERCIAL